# Patient Record
Sex: FEMALE | Race: WHITE
[De-identification: names, ages, dates, MRNs, and addresses within clinical notes are randomized per-mention and may not be internally consistent; named-entity substitution may affect disease eponyms.]

---

## 2017-03-06 NOTE — UCPHY
H & P


Patient Type: Established


Chief Complaint Nursing Narrative: c/o Migraine X 3 days last took Imatrex @ 

0300 this am and Percocet last night with out relief


Time Seen by Provider: 03/06/17 07:41


HPI/ROS: 





CHIEF COMPLAINT:  Headache, relentless





HISTORY OF PRESENT ILLNESS:  This is a 42-year-old female with a very 

longstanding history migraines.  She noted some 4 years ago that became under 

markedly good control when she started avoiding all grains.  She continues to 

require the Imitrex once or twice monthly and for the most part works.  However 

that is not the case this time around.





She started having a headache upon awakening yesterday morning.  Her typical 

pattern would be that of a mild headache the night before and then awakening 

quite a bit worse shape the next day.  However that certainly was not the case 

this time.  When she did wake up the pain was in the left periorbital area.  

Over the subsequent 24 hours the pain is involved in spread to include both the 

right eye as well. It does not affect the vision per se this is essentially 

left and right periorbital.





The pain itself was mildly 1st.  This progressively got worse and is moderate 

to severe at this time.  She actually has vomited on 2 occasions.  Of note is 

that she has had poor p.o. intake since this past Saturday some 36 hours ago as 

she had woken with the headache and the nausea as of yesterday morning.  She 

notes that she feels dry in the mouth.





She has had no fevers or earache or sore throat or discharge from nasal sinuses 

or years.





She denies any numbness tingling paresthesias loss of sensation.





Whereas she finds typically the Imitrex works for her and has not and she has 

tried 3 doses in the last 24 hours.  Furthermore she has also tried some 

Percocet which she has available for rescue.





P:  Not particularly worse with any given physician


Q:  Pounding


R:  Periorbital left leading to right


S:  Moderate to severe


T:  Progressive the last 24 hours





REVIEW OF SYSTEMS:


Constitutional:  No fever, no chills.


Eyes:  No diplopia.


ENT:  No sore throat.


Cardiovascular:  No chest pain, no palpitations.


Respiratory:  No cough, shortness of breath, or wheezing.


Gastrointestinal:  No nausea vomiting or diarrhea.  No abdominal pain.


Genitourinary:  No hematuria or frequency.


Musculoskeletal:  No back pain.


Skin:  No rashes.


Neurological:  See above





10 point ROS otherwise negative





Source: Patient


Exam Limitations: No limitations





- Personal History


LMP (Females 10-55): 1-7 Days Ago


Current Tetanus Diphtheria and Acellular Pertussis (TDAP): Yes





- Medical/Surgical History


Hx Asthma: No


Hx Chronic Respiratory Disease: No


Hx Diabetes: No


Hx Cardiac Disease: No


Hx Renal Disease: No


Other PMH: PTSD/Migraines /Bipolar = Lithium/ back issues





- Family History


Significant Family History: No pertinent family hx





- Social History


Smoking Status: Never smoked


Alcohol Use: None


Drug Use: None





- Physical Exam


Exam: 





General Appearance:  Alert, no distress.  Afebrile.  Normal phonation.  No 

respiratory distress.  While she is photosensitive she does not have photophobia


Eyes:  Pupils equal and round no pallor or injection.   No icterus.  Disc is 

sharp without any papilledema.


ENT, Mouth:  Mucous membranes dry.  Pharynx without erythema or exudate.  TM 

Clear.  


Neck:  No adenopathy.  Supple.  No JVD.  Trachea in midline.


Respiratory:  There are no retractions, lungs are clear to auscultation.


Neurological:  Ox3.  No motor weakness.  Sensation intact.  Gait nl.    Normal 

finger-to-nose.  No clonus.


Skin:  Warm and dry, no rashes.  


Musculoskeletal:  No joint swelling.


Extremities:  No edema. 


Psychiatric:  Normal affect


Constitutional: 


 Initial Vital Signs











Temperature (C)  36.6 C   03/06/17 07:37


 


Heart Rate  58 L  03/06/17 07:37


 


Respiratory Rate  18   03/06/17 07:37


 


Blood Pressure  165/96 H  03/06/17 07:37


 


O2 Sat (%)  97   03/06/17 07:37








 











O2 Delivery Mode               Room Air














Allergies/Adverse Reactions: 


 





No Known Allergies Allergy (Verified 08/25/13 22:04)


 








Home Medications: 














 Medication  Instructions  Recorded


 


Lithium Carbonate [Lithium 300 mg PO 10/20/12





Carbonate Cap 300 mg (RX)]  


 


Prestiq  10/20/12


 


SUMAtriptan [Imitrex 25 MG (RX)]  10/20/12


 


Fluticasone Hfa 220 Mcg [Flovent 1 puffs IH BID #1 mdi 09/16/13





220 MCG Hfa MDI (*)]  


 


Acetaminophen [Arthritis Pain 1,300 mg PO TID PRN #60 tablet.er 03/06/17





Relief]  


 


Ondansetron Odt [Zofran Odt 4 mg 8 mg PO TID PRN #10 tab 03/06/17





(*)]  


 


Oxycodone HCl 10 mg PO Q6H PRN #10 capsule 03/06/17


 


Percocet 5/325 (*)  03/06/17


 


predniSONE [Prednisone] 30 mg PO BID #30 tablet 03/06/17














Medical Decision Making


ED Course/Re-evaluation: 





IV is established


Given a L saline over an hour


Zofran 8 mg IV.





Did not get that much better after the Zofran thereby given Benadryl IV as well 

as Solu-Medrol IV.





The plan was to give her Phenergan 25 mg IV however ultimately decided against 

that wanted to go home convalesce.





She has declined a work note.  I have explained to her at length the reasons 

not to take ibuprofen or Aleve for her headaches, though she has not been 

taking them in the 1st place.  This would be on the basis of her being on 

lithium.  Going forward I recommend the following:


Zofran


Oxycodone p.r.n. - I checked her in the [place] drug database and she   Has 

not received any medications in the last year


Prednisone for 5 days, no taper necessary.


Tylenol extended-release


Differential Diagnosis: 





Differential Includes but is not limited to:


Intraparenchymal hemorrhage, subarachnoid hemorrhage, meningitis, migraine, 

status migraine, viral syndrome.








- Data Points


Medications Given: 


 








Discontinued Medications





Diphenhydramine HCl (Benadryl Injection)  12.5 mg IVP EDNOW ONE


   Stop: 03/06/17 09:02


   Last Admin: 03/06/17 09:11 Dose:  12.5 mg


Sodium Chloride (Ns)  1,000 mls @ 0 mls/hr IV ONCE ONE


   PRN Reason: Wide Open


   Stop: 03/06/17 08:06


   Last Admin: 03/06/17 08:17 Dose:  1,000 mls


Methylprednisolone Sodium Succinate (Solu-Medrol)  80 mg IVP EDNOW ONE


   Stop: 03/06/17 09:03


   Last Admin: 03/06/17 09:11 Dose:  80 mg


Ondansetron HCl (Zofran)  8 mg IVP ONCE ONE


   Stop: 03/06/17 08:06


   Last Admin: 03/06/17 08:17 Dose:  8 mg


Promethazine HCl (Phenergan)  25 mg IVP EDNOW ONE


   Stop: 03/06/17 09:26


   Last Admin: 03/06/17 09:55 Dose:  Not Given








Departure





- Departure


Disposition: Home, Routine, Self-Care


Clinical Impression: 


Migraine with status migrainosus


Qualifiers:


 Migraine type: without aura Intractability: not intractable Qualified Code(s): 

G43.001 - Migraine without aura, not intractable, with status migrainosus





Condition: Good


Referrals: 


Fern Huizar NP [Primary Care Provider] - As per Instructions


Prescriptions: 


Acetaminophen [Arthritis Pain Relief] 1,300 mg PO TID PRN #60 tablet.er


 PRN Reason: Pain, Moderate


Ondansetron Odt [Zofran Odt 4 mg (*)] 8 mg PO TID PRN #10 tab


 PRN Reason: Nausea/Vomiting, Can'T Take Po


Oxycodone HCl 10 mg PO Q6H PRN #10 capsule


 PRN Reason: pain


predniSONE [Prednisone] 30 mg PO BID #30 tablet





- PQRS


PQRS Measurement: 





Not applicable

## 2018-08-17 ENCOUNTER — HOSPITAL ENCOUNTER (OUTPATIENT)
Dept: HOSPITAL 80 - FIMAGING | Age: 43
End: 2018-08-17
Attending: MIDWIFE
Payer: COMMERCIAL

## 2018-08-17 DIAGNOSIS — R92.8: Primary | ICD-10-CM

## 2018-09-07 ENCOUNTER — HOSPITAL ENCOUNTER (OUTPATIENT)
Dept: HOSPITAL 80 - FIMAGING | Age: 43
End: 2018-09-07
Payer: COMMERCIAL

## 2018-09-07 DIAGNOSIS — R92.8: Primary | ICD-10-CM

## 2018-09-07 PROCEDURE — 77059: CPT

## 2018-09-07 PROCEDURE — 0159T: CPT

## 2018-09-07 PROCEDURE — A9585 GADOBUTROL INJECTION: HCPCS

## 2018-09-07 PROCEDURE — C8908 MRI W/O FOL W/CONT, BREAST,: HCPCS
